# Patient Record
Sex: FEMALE | Race: WHITE | NOT HISPANIC OR LATINO | URBAN - METROPOLITAN AREA
[De-identification: names, ages, dates, MRNs, and addresses within clinical notes are randomized per-mention and may not be internally consistent; named-entity substitution may affect disease eponyms.]

---

## 2017-03-28 ENCOUNTER — COMMUNICATION - HEALTHEAST (OUTPATIENT)
Dept: INTERNAL MEDICINE | Facility: CLINIC | Age: 27
End: 2017-03-28

## 2017-03-28 DIAGNOSIS — Z30.9 CONTRACEPTION MANAGEMENT: ICD-10-CM

## 2017-06-20 ENCOUNTER — COMMUNICATION - HEALTHEAST (OUTPATIENT)
Dept: INTERNAL MEDICINE | Facility: CLINIC | Age: 27
End: 2017-06-20

## 2017-06-20 DIAGNOSIS — Z30.9 CONTRACEPTION MANAGEMENT: ICD-10-CM

## 2017-07-19 ENCOUNTER — COMMUNICATION - HEALTHEAST (OUTPATIENT)
Dept: INTERNAL MEDICINE | Facility: CLINIC | Age: 27
End: 2017-07-19

## 2017-07-21 ENCOUNTER — OFFICE VISIT - HEALTHEAST (OUTPATIENT)
Dept: INTERNAL MEDICINE | Facility: CLINIC | Age: 27
End: 2017-07-21

## 2017-07-21 ENCOUNTER — COMMUNICATION - HEALTHEAST (OUTPATIENT)
Dept: INTERNAL MEDICINE | Facility: CLINIC | Age: 27
End: 2017-07-21

## 2017-07-21 ENCOUNTER — COMMUNICATION - HEALTHEAST (OUTPATIENT)
Dept: TELEHEALTH | Facility: CLINIC | Age: 27
End: 2017-07-21

## 2017-07-21 DIAGNOSIS — L72.0 INFECTED INCLUSION CYST: ICD-10-CM

## 2017-07-21 DIAGNOSIS — L02.91 ABSCESS: ICD-10-CM

## 2017-07-21 DIAGNOSIS — L08.9 INFECTED INCLUSION CYST: ICD-10-CM

## 2017-09-13 ENCOUNTER — COMMUNICATION - HEALTHEAST (OUTPATIENT)
Dept: SCHEDULING | Facility: CLINIC | Age: 27
End: 2017-09-13

## 2017-09-13 ENCOUNTER — OFFICE VISIT - HEALTHEAST (OUTPATIENT)
Dept: INTERNAL MEDICINE | Facility: CLINIC | Age: 27
End: 2017-09-13

## 2017-09-13 DIAGNOSIS — Z00.00 HEALTHCARE MAINTENANCE: ICD-10-CM

## 2017-09-13 DIAGNOSIS — Z30.9 CONTRACEPTION MANAGEMENT: ICD-10-CM

## 2017-09-13 DIAGNOSIS — R25.2 CRAMP OF BOTH LOWER EXTREMITIES: ICD-10-CM

## 2017-09-13 ASSESSMENT — MIFFLIN-ST. JEOR: SCORE: 1357.25

## 2017-09-20 ENCOUNTER — AMBULATORY - HEALTHEAST (OUTPATIENT)
Dept: LAB | Facility: CLINIC | Age: 27
End: 2017-09-20

## 2017-09-20 DIAGNOSIS — Z00.00 HEALTHCARE MAINTENANCE: ICD-10-CM

## 2017-09-20 DIAGNOSIS — R25.2 CRAMP OF BOTH LOWER EXTREMITIES: ICD-10-CM

## 2017-09-20 LAB
CHOLEST SERPL-MCNC: 148 MG/DL
FASTING STATUS PATIENT QL REPORTED: YES
HDLC SERPL-MCNC: 49 MG/DL
LDLC SERPL CALC-MCNC: 86 MG/DL
TRIGL SERPL-MCNC: 63 MG/DL

## 2017-09-21 ENCOUNTER — COMMUNICATION - HEALTHEAST (OUTPATIENT)
Dept: INTERNAL MEDICINE | Facility: CLINIC | Age: 27
End: 2017-09-21

## 2017-09-27 ENCOUNTER — COMMUNICATION - HEALTHEAST (OUTPATIENT)
Dept: INTERNAL MEDICINE | Facility: CLINIC | Age: 27
End: 2017-09-27

## 2017-09-27 DIAGNOSIS — J45.20 MILD INTERMITTENT ASTHMA WITHOUT COMPLICATION: ICD-10-CM

## 2017-09-28 ENCOUNTER — COMMUNICATION - HEALTHEAST (OUTPATIENT)
Dept: INTERNAL MEDICINE | Facility: CLINIC | Age: 27
End: 2017-09-28

## 2017-09-28 DIAGNOSIS — J45.20 MILD INTERMITTENT ASTHMA WITHOUT COMPLICATION: ICD-10-CM

## 2018-07-23 ENCOUNTER — OFFICE VISIT - HEALTHEAST (OUTPATIENT)
Dept: INTERNAL MEDICINE | Facility: CLINIC | Age: 28
End: 2018-07-23

## 2018-07-23 ENCOUNTER — COMMUNICATION - HEALTHEAST (OUTPATIENT)
Dept: TELEHEALTH | Facility: CLINIC | Age: 28
End: 2018-07-23

## 2018-07-23 DIAGNOSIS — N63.0 LUMP OR MASS IN BREAST: ICD-10-CM

## 2018-07-30 ENCOUNTER — HOSPITAL ENCOUNTER (OUTPATIENT)
Dept: ULTRASOUND IMAGING | Facility: CLINIC | Age: 28
Discharge: HOME | End: 2018-07-30
Attending: FAMILY MEDICINE

## 2018-07-30 DIAGNOSIS — N63.0 LUMP OR MASS IN BREAST: ICD-10-CM

## 2018-07-31 ENCOUNTER — COMMUNICATION - HEALTHEAST (OUTPATIENT)
Dept: INTERNAL MEDICINE | Facility: CLINIC | Age: 28
End: 2018-07-31

## 2018-09-01 ENCOUNTER — COMMUNICATION - HEALTHEAST (OUTPATIENT)
Dept: SCHEDULING | Facility: CLINIC | Age: 28
End: 2018-09-01

## 2018-09-01 DIAGNOSIS — Z30.9 CONTRACEPTION MANAGEMENT: ICD-10-CM

## 2018-09-04 ENCOUNTER — COMMUNICATION - HEALTHEAST (OUTPATIENT)
Dept: INTERNAL MEDICINE | Facility: CLINIC | Age: 28
End: 2018-09-04

## 2018-11-29 ENCOUNTER — OFFICE VISIT - HEALTHEAST (OUTPATIENT)
Dept: INTERNAL MEDICINE | Facility: CLINIC | Age: 28
End: 2018-11-29

## 2018-11-29 DIAGNOSIS — Z00.00 ROUTINE MEDICAL EXAM: ICD-10-CM

## 2018-11-29 DIAGNOSIS — D24.2 FIBROADENOMA OF LEFT BREAST: ICD-10-CM

## 2018-11-29 ASSESSMENT — MIFFLIN-ST. JEOR: SCORE: 1346.7

## 2019-01-21 ENCOUNTER — HOSPITAL ENCOUNTER (OUTPATIENT)
Dept: ULTRASOUND IMAGING | Facility: CLINIC | Age: 29
Discharge: HOME | End: 2019-01-21
Attending: FAMILY MEDICINE

## 2019-01-21 DIAGNOSIS — D24.2 FIBROADENOMA OF LEFT BREAST: ICD-10-CM

## 2019-07-11 ENCOUNTER — COMMUNICATION - HEALTHEAST (OUTPATIENT)
Dept: INTERNAL MEDICINE | Facility: CLINIC | Age: 29
End: 2019-07-11

## 2019-07-11 DIAGNOSIS — J45.20 MILD INTERMITTENT ASTHMA WITHOUT COMPLICATION: ICD-10-CM

## 2019-07-11 RX ORDER — ALBUTEROL SULFATE 90 UG/1
2 AEROSOL, METERED RESPIRATORY (INHALATION) EVERY 6 HOURS PRN
Qty: 1 EACH | Refills: 0 | Status: SHIPPED | OUTPATIENT
Start: 2019-07-11

## 2019-08-02 ENCOUNTER — COMMUNICATION - HEALTHEAST (OUTPATIENT)
Dept: SCHEDULING | Facility: CLINIC | Age: 29
End: 2019-08-02

## 2019-08-02 DIAGNOSIS — Z30.9 CONTRACEPTION MANAGEMENT: ICD-10-CM

## 2019-09-30 ENCOUNTER — COMMUNICATION - HEALTHEAST (OUTPATIENT)
Dept: FAMILY MEDICINE | Facility: CLINIC | Age: 29
End: 2019-09-30

## 2019-09-30 DIAGNOSIS — N63.0 LUMP OR MASS IN BREAST: ICD-10-CM

## 2019-10-07 ENCOUNTER — HOSPITAL ENCOUNTER (OUTPATIENT)
Dept: ULTRASOUND IMAGING | Facility: CLINIC | Age: 29
Discharge: HOME | End: 2019-10-07
Attending: FAMILY MEDICINE

## 2019-10-07 DIAGNOSIS — N63.0 LUMP OR MASS IN BREAST: ICD-10-CM

## 2019-10-22 ENCOUNTER — COMMUNICATION - HEALTHEAST (OUTPATIENT)
Dept: SCHEDULING | Facility: CLINIC | Age: 29
End: 2019-10-22

## 2019-10-22 DIAGNOSIS — Z30.9 CONTRACEPTION MANAGEMENT: ICD-10-CM

## 2019-10-28 ENCOUNTER — COMMUNICATION - HEALTHEAST (OUTPATIENT)
Dept: SCHEDULING | Facility: CLINIC | Age: 29
End: 2019-10-28

## 2019-10-28 DIAGNOSIS — Z30.9 CONTRACEPTION MANAGEMENT: ICD-10-CM

## 2019-10-29 RX ORDER — NORGESTIMATE AND ETHINYL ESTRADIOL 7DAYSX3 28
1 KIT ORAL DAILY
Qty: 28 TABLET | Refills: 5 | Status: SHIPPED | OUTPATIENT
Start: 2019-10-29

## 2021-05-30 NOTE — TELEPHONE ENCOUNTER
Refill Approved    Rx renewed per Medication Renewal Policy. Medication was last renewed on 9/28/17.    Abby Mann, [facility] Triage/Med Refill 7/11/2019     Requested Prescriptions   Pending Prescriptions Disp Refills     albuterol (PROAIR HFA;PROVENTIL HFA;VENTOLIN HFA) 90 mcg/actuation inhaler 1 each 0     Sig: Inhale 2 puffs every 6 (six) hours as needed for wheezing.       Albuterol/Levalbuterol Refill Protocol Passed - 7/11/2019  9:56 PM        Passed - PCP or prescribing provider visit in last year     Last office visit with prescriber/PCP: 7/29/2016 Xu Ruiz MD OR same dept: 7/23/2018 Woo Saucedo MD OR same specialty: 7/23/2018 Woo Saucedo MD Last physical: 9/13/2017       Next appt within 3 mo: Visit date not found  Next physical within 3 mo: Visit date not found  Prescriber OR PCP: Xu Ruiz MD  Last diagnosis associated with med order: 1. Mild intermittent asthma without complication  - albuterol (PROAIR HFA;PROVENTIL HFA;VENTOLIN HFA) 90 mcg/actuation inhaler; Inhale 2 puffs every 6 (six) hours as needed for wheezing.  Dispense: 1 each; Refill: 0    If protocol passes may refill for 6 months if within 3 months of last provider visit (or a total of 9 months). If patient requesting >1 inhaler per month refill x 6 months and have patient make appointment with provider.

## 2021-05-31 VITALS — BODY MASS INDEX: 23.08 KG/M2 | WEIGHT: 138.7 LBS

## 2021-05-31 VITALS — BODY MASS INDEX: 22.34 KG/M2 | WEIGHT: 139 LBS | HEIGHT: 66 IN

## 2021-05-31 NOTE — TELEPHONE ENCOUNTER
Refill Approved    Rx renewed per Medication Renewal Policy. Medication was last renewed on 11.29.18.    Denise Westfall, [facility] Triage/Med Refill 8/2/2019     Requested Prescriptions   Pending Prescriptions Disp Refills     TRI-PREVIFEM, 28, 0.18/0.215/0.25 mg-35 mcg (28) Tab tablet [Pharmacy Med Name: TRI-PREVIFEM TABLET] 84 tablet 3     Sig: TAKE 1 TABLET EVERY DAY       Oral Contraceptives Protocol Passed - 8/2/2019 12:55 AM        Passed - Visit with PCP or prescribing provider visit in last 12 months      Last office visit with prescriber/PCP: 7/29/2016 Xu Ruiz MD OR same dept: Visit date not found OR same specialty: Visit date not found  Last physical: 9/13/2017 Last MTM visit: Visit date not found   Next visit within 3 mo: Visit date not found  Next physical within 3 mo: Visit date not found  Prescriber OR PCP: Xu Ruiz MD  Last diagnosis associated with med order: 1. Contraception management  - TRI-PREVIFEM, 28, 0.18/0.215/0.25 mg-35 mcg (28) Tab tablet [Pharmacy Med Name: TRI-PREVIFEM TABLET]; TAKE 1 TABLET EVERY DAY  Dispense: 84 tablet; Refill: 3    If protocol passes may refill for 12 months if within 3 months of last provider visit (or a total of 15 months).

## 2021-06-01 VITALS — WEIGHT: 138.8 LBS | BODY MASS INDEX: 22.4 KG/M2

## 2021-06-02 VITALS — HEIGHT: 65 IN | WEIGHT: 139.3 LBS | BODY MASS INDEX: 23.21 KG/M2

## 2021-06-02 NOTE — TELEPHONE ENCOUNTER
Refill Approved    Rx renewed per Medication Renewal Policy. Medication was last renewed on 8/2/19.    Abby Mann, [facility] Triage/Med Refill 10/22/2019     Requested Prescriptions   Pending Prescriptions Disp Refills     TRI-PREVIFEM, 28, 0.18/0.215/0.25 mg-35 mcg (28) Tab tablet [Pharmacy Med Name: TRI-PREVIFEM TABLET] 84 tablet 0     Sig: TAKE 1 TABLET EVERY DAY       Oral Contraceptives Protocol Passed - 10/22/2019  1:27 AM        Passed - Visit with PCP or prescribing provider visit in last 12 months      Last office visit with prescriber/PCP: 7/29/2016 Xu Ruiz MD OR same dept: Visit date not found OR same specialty: Visit date not found  Last physical: 9/13/2017 Last MTM visit: Visit date not found   Next visit within 3 mo: Visit date not found  Next physical within 3 mo: Visit date not found  Prescriber OR PCP: Xu Ruiz MD  Last diagnosis associated with med order: 1. Contraception management  - TRI-PREVIFEM, 28, 0.18/0.215/0.25 mg-35 mcg (28) Tab tablet [Pharmacy Med Name: TRI-PREVIFEM TABLET]; TAKE 1 TABLET EVERY DAY  Dispense: 84 tablet; Refill: 0    If protocol passes may refill for 12 months if within 3 months of last provider visit (or a total of 15 months).

## 2021-06-02 NOTE — TELEPHONE ENCOUNTER
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  Her last OV was 11/29/2018 for an Annual Exam.   She has a physical set up for 2/21/2020.  See her note from 10/28.  I am thinking it would be okay for me to fill it but want to make sure you don't want to fit her in somewhere sooner  Thank you  Domitila Espinoza RN, BAN, Nurse Advisor, [place] 11p-7a

## 2021-06-12 NOTE — PROGRESS NOTES
Assessment & Plan:     1. Abscess  Culture/Gram Stain: Wound    lidocaine 1%-EPINEPHrine 1:100,000 1 %-1:100,000 injection 2 mL (XYLOCAINE W/EPI)    cephalexin capsule 500 mg (KEFLEX)   2. Infected inclusion cyst  Culture/Gram Stain: Wound    lidocaine 1%-EPINEPHrine 1:100,000 1 %-1:100,000 injection 2 mL (XYLOCAINE W/EPI)    cephalexin capsule 500 mg (KEFLEX) 500 mg 3 times daily for 7 days.  If it is not getting better she should return for follow-up within the next week.  If she notices that it is reaccumulating after the packing falls out she should return for re-incision and drainage.  She may take Tylenol or ibuprofen as needed for pain.     Procedure: Informed consent obtained.  Patient was placed on the exam table prone; abscess was cleansed and then anesthetized with 1% lidocaine with epinephrine 1 mL each side.  Good anesthesia was achieved.  The abscess/infected inclusion cyst had a head to it that started draining with just some pressure.  Using an 11 blade the opening was enlarged to allow for more additional drainage of moderate amount of purulent material.  The wound was packed with quarters inch packing gauze.  Dressing was applied.  She was told to keep it on for 24 hours and then she can shower and take the wound dressing off.    Subjective:       Merle Oshea is a 26 y.o. female who presents for evaluation of a subcutaneous nodule located over the the middle of her upper back for the last week.  She noticed it at first to be a small pimple-like lesion and she started picking at it and did drain some pustular looking drainage.  And over the course of the week it continued to increase in size and very painful for her.  The area is very red and warm.  Denies any fevers or chills.  Denies any recent bug bites.  Denies any recent travel outside of the country although she did go to [place] recently.    The following portions of the patient's history were reviewed and updated as appropriate:  allergies, current medications, past family history, past medical history, past social history, past surgical history and problem list.    Review of Systems  A 12 point comprehensive review of systems was negative except as noted.      Objective:      /74  Pulse 68  Wt 138 lb 11.2 oz (62.9 kg)  BMI 23.08 kg/m2  Physical Exam    Skin: 2.5 centimeter subcutaneous nodule over the upper back. The lesion is not mobile. There is moderate erythema.  There is moderate tenderness.  there is a center that is scabbed over and when pushed on it purulent drainage came out.      Gen: alert and oriented X3. Looks in No distress  HEENT: PERLLA; EOMI. O/P clear and No erythema. Nose clear with no drainage.   Neck: supple with no LAD.  Lungs: Yes normal breath sounds bilaterally without wheezing or rhonchi  CV: RRR without murmur or gallop. Normal pulses. CRT <2 sec  Abd: soft and Normal BS. No tenderness; No distention. No organomegaly.     Joseph Kennedy MD

## 2021-06-12 NOTE — PROGRESS NOTES
Assessment:     Healthy female exam.   All preventive exams are up-to-date.  Fasting labs will be done next week.  Leg cramps -discussed importance of regular exercise, stretching and massage before bedtime, I will check a magnesium, potassium, calcium, TSH, B12 and her labs.      This note has been dictated using voice recognition software. Any grammatical or context distortions are unintentional and inherent to the software    1. Cramp of both lower extremities  HM2(CBC w/o Differential)    Lipid Profile    Thyroid Stimulating Hormone (TSH)    Vitamin D, Total (25-Hydroxy)    Comprehensive Metabolic Panel    Urinalysis-UC if Indicated    Magnesium    Vitamin B12    Ferritin   2. Contraception management  norgestimate-ethinyl estradiol (ORTHO TRI-CYCLEN, 28,) 0.18/0.215/0.25 mg-35 mcg (28) Tab tablet   3. Healthcare maintenance  HM2(CBC w/o Differential)    Lipid Profile    Thyroid Stimulating Hormone (TSH)    Vitamin D, Total (25-Hydroxy)    Comprehensive Metabolic Panel    Urinalysis-UC if Indicated    Magnesium    Vitamin B12    Ferritin         Patient Instructions   Tdap - done 10/2014    Yo should set up lab only appt.    You should take magnesium pill daily.          Plan:          Return in about 1 year (around 9/13/2018) for Annual physical.    Subjective:       Chief Complaint   Patient presents with     Annual Exam     talk about birth control rx if going to continue        Merle Oshea is a 26 y.o. female who presents for an annual exam.  She is complaining today about frequent bilateral lower extremity cramps in her muscles, happening at any time, even when she is sitting but also during the nighttime.  She did try some increase exercise level and she did notice that with more moving her legs cramp last.  She is not taking any supplements on a daily basis.  She was never tested but she thinks that she is lactose sensitive so she is not really eating dairy products.  PAP was done last year, all  previous normal, no symptoms of concern.    Healthy Habits:   Regular Exercise: Yes  Sunscreen Use: Yes  Healthy Diet: Yes  Dental Visits Regularly: Yes  Seat Belt: Yes  Immunization status: up to date and documented.    Health Maintenance   Topic Date Due     ASTHMA CONTROL TEST  1988     ASTHMA FOLLOW-UP  1988     TD 18+ HE  11/27/2006     TDAP ADULT ONE TIME DOSE  11/27/2006     INFLUENZA VACCINE RULE BASED (1) 08/01/2017     PAP SMEAR  01/19/2019     ADVANCE DIRECTIVES DISCUSSED WITH PATIENT  11/11/2020       Social History     Social History     Marital status: Single     Spouse name: N/A     Number of children: N/A     Years of education: N/A     Occupational History     Not on file.     Social History Main Topics     Smoking status: Never Smoker     Smokeless tobacco: Not on file     Alcohol use 1.5 oz/week     3 drink(s) per week     Drug use: No     Sexual activity: Yes     Partners: Male     Birth control/ protection: Condom     Other Topics Concern     Not on file     Social History Narrative       Family History   Problem Relation Age of Onset     Alcohol abuse Mother      Heart disease Mother      Hypertension Father      Cancer Maternal Aunt      Cancer Paternal Aunt      Cancer Paternal Uncle        Patient Active Problem List   Diagnosis     Mild intermittent asthma without complication     Nephrolithiasis     Ovarian cyst     Contraception management       Current Outpatient Prescriptions on File Prior to Visit   Medication Sig Dispense Refill     albuterol sulfate 90 mcg/actuation AePB Inhale 180 mcg every 4 (four) hours. 1 each 3     mometasone-formoterol (DULERA) 200-5 mcg/actuation HFAA inhaler Inhale 2 puffs 2 (two) times a day. 13 g 3     [DISCONTINUED] norgestimate-ethinyl estradiol (ORTHO TRI-CYCLEN, 28,) 0.18/0.215/0.25 mg-35 mcg (28) Tab tablet Take 1 tablet by mouth daily. 3 Package 0     Current Facility-Administered Medications on File Prior to Visit   Medication Dose Route  "Frequency Provider Last Rate Last Dose     [DISCONTINUED] lidocaine 1%-EPINEPHrine 1:100,000 1 %-1:100,000 injection 2 mL (XYLOCAINE W/EPI)  2 mL Intradermal Once Joseph Kennedy MD           Review of Systems  A 12 point comprehensive review of systems was negative except as noted in HPI.         Objective:      /70  Pulse 72  Ht 5' 6\" (1.676 m)  Wt 139 lb (63 kg)  LMP 09/08/2017  BMI 22.44 kg/m2    Body mass index is 22.44 kg/(m^2).        Physical Exam:  General Appearance: Alert, cooperative, no distress, appears stated age.  Head: Normocephalic, without obvious abnormality, atraumatic  Eyes: PERRL, conjunctiva/corneas clear, EOM's intact  Ears: Normal TM's and external ear canals, both ears  Nose: Nares normal, septum midline,mucosa normal, no drainage  Throat: Lips, mucosa, and tongue normal; teeth and gums normal  Neck: Supple, symmetrical, trachea midline, no adenopathy;  thyroid: not enlarged, symmetric, no tenderness/mass/nodules; no carotid bruit or JVD  Back: Symmetric, no curvature, ROM normal, no CVA tenderness.  Lungs: Clear to auscultation bilaterally, respirations unlabored.  Breasts: No breast masses, tenderness, asymmetry, or nipple discharge.  Heart: Regular rate and rhythm, S1 and S2 normal, no murmur, rub, or gallop.  Abdomen: Soft, non-tender, bowel sounds active all four quadrants,  no masses, no organomegaly.  Pelvic:Not done. PAP done last year.  Extremities: Extremities normal, atraumatic, no cyanosis or edema.  Skin: Skin color, texture, turgor normal, no rashes or lesions.  Lymph nodes: Cervical, supraclavicular, and axillary nodes normal.  Neurologic: No focal neurological findings.           "

## 2021-06-19 NOTE — PROGRESS NOTES
1. Lump or mass in breast  Mammo Diagnostic Left    US Breast Limited (Focal) Left      Plan we will go ahead and get intermittent diagnostic mammogram and an ultrasound of that left breast to take a look at that lump and see what it might be.  I believe that it is very benign in appearance and how it feels, but we will see what exactly it looks like and make some decisions on what needs to be done after that.  Patient is comfortable with this plan and her questions were answered today.  We will have the schedule set that up and follow-up with the results when they become available.    Subjective 27-year-old female who is here today with a lump that she is found in her left breast.  She has noticed this now for about 6 weeks.  She has watched this on her own, through 2 menstrual cycles and it has not changed in size or texture or quality.  She is concerned because it feels like it is stuck down to the tissue below it, and she has had her mother who has had numerous breast lumps addressed and some of them ignored for too long and the become large and unwieldy.  She would like to know what this is before gets to that point.    She has no family history of any breast cancer.    Objective: Well-appearing female in no acute distress.  Vital signs as noted.  Right breast is normal on exam.  The left breast shows a small rounded lump at about the 1 o'clock position, it may be tacked down to the tissue underneath it, however I believe it is just that she has fairly dense breast tissue and it moves along with the tissue when it is examined.  There is no lymphadenopathy appreciated in the breast exam otherwise is unremarkable

## 2021-06-26 ENCOUNTER — HEALTH MAINTENANCE LETTER (OUTPATIENT)
Age: 31
End: 2021-06-26

## 2021-06-26 NOTE — PROGRESS NOTES
Progress Notes by Woo Saucedo MD at 11/29/2018  3:20 PM     Author: Woo Saucedo MD Service: -- Author Type: Physician    Filed: 12/4/2018 12:06 PM Encounter Date: 11/29/2018 Status: Signed    : Woo Saucedo MD (Physician)       FEMALE PREVENTATIVE EXAM    Assessment and Plan:       1. Routine medical exam    Generally she is doing quite well, we discussed healthy lifestyles today, including adequate exercise and a healthy diet.  I do not think she needs any labs today and she is not fasting anyway, so she can return next year and have those done if she would desire.    2. Fibroadenoma of left breast    - US Breast Limited (Focal) Left; Future        Next follow up:  No Follow-up on file.    Immunization Review  Adult Imm Review: No immunizations due today    I discussed the following with the patient:   Adult Healthy Living: Importance of regular exercise  Healthy nutrition  Getting adequate sleep  Stress management      Subjective:   Chief Complaint: Merle Oshea is an 28 y.o. female here for a preventative health visit.     HPI: Patient is here for full physical today.  She has no major concerns questions or problems today.  She does need a reimaging of her breast as they wanted that done in 6 months, so we will schedule that for January 2019.    Healthy Habits  Are you taking a daily aspirin? No  Do you typically exercising at least 40 min, 3-4 times per week?  NO  Do you usually eat at least 4 servings of fruit and vegetables a day, include whole grains and fiber and avoid regularly eating high fat foods? Yes  Have you had an eye exam in the past two years? Yes  Do you see a dentist twice per year? Yes  Do you have any concerns regarding sleep? No    Safety Screen  If you own firearms, are they secured in a locked gun cabinet or with trigger locks? The patient does not own any firearms  No Data Recorded    Review of Systems:  Please see above.  The rest of the review of systems are negative for  "all systems.     Pap History:   No - age 21-29 PAP every 3 years recommended  Cancer Screening       Status Date      PAP SMEAR Next Due 1/19/2021      Done 1/19/2016 GYNECOLOGIC CYTOLOGY (PAP SMEAR)            History     Reviewed By Date/Time Sections Reviewed    Woo Saucedo MD 11/29/2018  3:17 PM Medical, Surgical, Tobacco, Alcohol, Drug Use, Sexual Activity, Family            Objective:   Vital Signs:   Visit Vitals  /72 (Patient Site: Right Arm, Patient Position: Sitting, Cuff Size: Adult Regular)   Pulse 99   Ht 5' 5.25\" (1.657 m)   Wt 139 lb 4.8 oz (63.2 kg)   SpO2 100%   BMI 23.00 kg/m           PHYSICAL EXAM    General: Awake, Alert and Cooperative   Head: Normocephalic and Atraumatic   Eyes: PERRL, EOMI.   ENT: Normal pearly TMs bilaterally and Oropharynx clear   Neck: Supple and Thyroid without enlargement or nodules   Chest: Chest wall normal   Lungs: Clear to auscultation bilaterally   Heart:: Regular rate and rhythm and no murmurs.  No significant LE edema.   Abdomen: Soft, nontender, nondistended and no hepatosplenomegaly   Musculoskeletal: Moving all extremities and No pain in the extremities   Neuro: Alert and oriented times 3 and Grossly normal   Skin: No rashes or lesions noted               Medication List           Accurate as of 11/29/18 11:59 PM. If you have any questions, ask your nurse or doctor.               CONTINUE taking these medications    albuterol 90 mcg/actuation inhaler  Also known as:  PROAIR HFA;PROVENTIL HFA;VENTOLIN HFA  INSTRUCTIONS:  Inhale 2 puffs every 6 (six) hours as needed for wheezing.  Doctor's comments:  May substitute the equivalent medication per insurance preference.        mometasone-formoterol 200-5 mcg/actuation Hfaa inhaler  Also known as:  DULERA  INSTRUCTIONS:  Inhale 2 puffs 2 (two) times a day.        TRI-PREVIFEM (28) 0.18/0.215/0.25 mg-35 mcg (28) Tab tablet  INSTRUCTIONS:  TAKE 1 TABLET EVERY DAY  Generic drug:  norgestimate-ethinyl estradiol   "             Additional Screenings Completed Today:

## 2021-10-16 ENCOUNTER — HEALTH MAINTENANCE LETTER (OUTPATIENT)
Age: 31
End: 2021-10-16

## 2022-07-23 ENCOUNTER — HEALTH MAINTENANCE LETTER (OUTPATIENT)
Age: 32
End: 2022-07-23

## 2022-10-01 ENCOUNTER — HEALTH MAINTENANCE LETTER (OUTPATIENT)
Age: 32
End: 2022-10-01

## 2023-08-06 ENCOUNTER — HEALTH MAINTENANCE LETTER (OUTPATIENT)
Age: 33
End: 2023-08-06